# Patient Record
Sex: FEMALE | URBAN - METROPOLITAN AREA
[De-identification: names, ages, dates, MRNs, and addresses within clinical notes are randomized per-mention and may not be internally consistent; named-entity substitution may affect disease eponyms.]

---

## 2021-04-18 ENCOUNTER — NURSE TRIAGE (OUTPATIENT)
Dept: NURSING | Facility: CLINIC | Age: 71
End: 2021-04-18

## 2021-04-18 NOTE — TELEPHONE ENCOUNTER
Ill for two weeks, diagnosed with sinus infection, on Amoxicillin finished yesterday. Vomiting and diarrhea lightheaded and dizzy, not improving. Recommended she be evaluated ER. She is going to have her daughter bring her.